# Patient Record
Sex: MALE | URBAN - METROPOLITAN AREA
[De-identification: names, ages, dates, MRNs, and addresses within clinical notes are randomized per-mention and may not be internally consistent; named-entity substitution may affect disease eponyms.]

---

## 2021-05-18 ENCOUNTER — OFFICE VISIT (OUTPATIENT)
Dept: URBAN - METROPOLITAN AREA CLINIC 13 | Facility: CLINIC | Age: 36
End: 2021-05-18
Payer: COMMERCIAL

## 2021-05-18 DIAGNOSIS — H30.23 BILATERAL PARS PLANITIS: Primary | ICD-10-CM

## 2021-05-18 DIAGNOSIS — H25.043 POSTERIOR SUBCAPSULAR POLAR AGE-RELATED CATARACT, BILATERAL: ICD-10-CM

## 2021-05-18 DIAGNOSIS — H33.311 HORSESHOE TEAR OF RETINA WITHOUT DETACHMENT, RIGHT EYE: ICD-10-CM

## 2021-05-18 PROCEDURE — 92134 CPTRZ OPH DX IMG PST SGM RTA: CPT | Performed by: OPHTHALMOLOGY

## 2021-05-18 PROCEDURE — 99214 OFFICE O/P EST MOD 30 MIN: CPT | Performed by: OPHTHALMOLOGY

## 2021-05-18 ASSESSMENT — INTRAOCULAR PRESSURE
OD: 13
OS: 13

## 2021-05-18 NOTE — IMPRESSION/PLAN
Impression: Posterior subcapsular polar age-related cataract, bilateral Plan: Stable, possibly early visually significant

## 2021-05-18 NOTE — IMPRESSION/PLAN
Impression: Bilateral pars planitis OCT OU= no SRF/IRF  / 288 Optos FA OU - 05/18/2021 = no leak at nerve/vessels/macula OU Plan: Exam/optos colors show no active inflammation OU. Dx'd in 2010, no h/o injections or systemic immunosuppression; only topical Tx as needed. 

6-12 months, OCT OU, Optos FA OU NPHX

## 2022-09-26 ENCOUNTER — OFFICE VISIT (OUTPATIENT)
Dept: URBAN - METROPOLITAN AREA CLINIC 13 | Facility: CLINIC | Age: 37
End: 2022-09-26
Payer: COMMERCIAL

## 2022-09-26 DIAGNOSIS — H25.043 POSTERIOR SUBCAPSULAR POLAR AGE-RELATED CATARACT, BILATERAL: ICD-10-CM

## 2022-09-26 DIAGNOSIS — H33.311 HORSESHOE TEAR OF RETINA WITHOUT DETACHMENT, RIGHT EYE: ICD-10-CM

## 2022-09-26 DIAGNOSIS — H30.23 BILATERAL PARS PLANITIS: Primary | ICD-10-CM

## 2022-09-26 PROCEDURE — 99213 OFFICE O/P EST LOW 20 MIN: CPT | Performed by: OPHTHALMOLOGY

## 2022-09-26 PROCEDURE — 92134 CPTRZ OPH DX IMG PST SGM RTA: CPT | Performed by: OPHTHALMOLOGY

## 2022-09-26 ASSESSMENT — INTRAOCULAR PRESSURE
OD: 19
OS: 12

## 2022-09-26 NOTE — IMPRESSION/PLAN
Impression: Horseshoe tear of retina without detachment, right eye
s/p laser Plan: no RD/RT no ERM
RDW
call ASAP with changes

## 2022-09-26 NOTE — IMPRESSION/PLAN
Impression: Posterior subcapsular polar age-related cataract, bilateral Plan: Stable, possibly early visually significant Rec obs

## 2022-09-26 NOTE — IMPRESSION/PLAN
Impression: Bilateral pars planitis OCT OU= no SRF/IRF OU  / 276 Optos FA OU (05/18/2021) = no leak at nerve/vessels/macula OU Optos FA OU 09/26/2022 = stain of laser scars OD, but no leak at nerve/vessels/macula OU Plan: Exam/optos colors show no active inflammation OU. Dx'd in 2010, no h/o injections or systemic immunosuppression; only topical Tx as needed. Stable. Rec obs.  

12 months, OCT OU

## 2023-05-24 ENCOUNTER — OFFICE VISIT (OUTPATIENT)
Dept: URBAN - METROPOLITAN AREA CLINIC 13 | Facility: CLINIC | Age: 38
End: 2023-05-24
Payer: COMMERCIAL

## 2023-05-24 DIAGNOSIS — H25.043 POSTERIOR SUBCAPSULAR POLAR AGE-RELATED CATARACT, BILATERAL: ICD-10-CM

## 2023-05-24 DIAGNOSIS — H30.23 BILATERAL PARS PLANITIS: Primary | ICD-10-CM

## 2023-05-24 DIAGNOSIS — H33.311 HORSESHOE TEAR OF RETINA WITHOUT DETACHMENT, RIGHT EYE: ICD-10-CM

## 2023-05-24 PROCEDURE — 92134 CPTRZ OPH DX IMG PST SGM RTA: CPT | Performed by: OPHTHALMOLOGY

## 2023-05-24 PROCEDURE — 92235 FLUORESCEIN ANGRPH MLTIFRAME: CPT | Performed by: OPHTHALMOLOGY

## 2023-05-24 PROCEDURE — 92250 FUNDUS PHOTOGRAPHY W/I&R: CPT | Performed by: OPHTHALMOLOGY

## 2023-05-24 PROCEDURE — 99212 OFFICE O/P EST SF 10 MIN: CPT | Performed by: OPHTHALMOLOGY

## 2023-05-24 ASSESSMENT — INTRAOCULAR PRESSURE
OD: 18
OS: 26

## 2023-05-24 NOTE — IMPRESSION/PLAN
Impression: Bilateral pars planitis OCT OU = no SRF/IRF OU /282 Optos FA OU (05/18/2021) = no leak at nerve/vessels/macula OU Optos FA OU (09/26/2022) = stain of laser scars OD, but no leak at nerve/vessels/macula OU Optos FA OU (05/24/2023) = stain of laser scars OD, but no leak at nerve/vessels/macula OU Plan: Noticed more floaters over the past month. Today, symptoms are improving. Will obs for now, if he notices increase in floaters, ok to start PF QID OD. Exam/optos colors show no active inflammation OU. Dx'd in 2010, no h/o injections or systemic immunosuppression; only topical Tx as needed. 


6 months, OCT OU

## 2023-07-06 ENCOUNTER — OFFICE VISIT (OUTPATIENT)
Dept: URBAN - METROPOLITAN AREA CLINIC 7 | Facility: CLINIC | Age: 38
End: 2023-07-06
Payer: COMMERCIAL

## 2023-07-06 DIAGNOSIS — H25.043 POSTERIOR SUBCAPSULAR POLAR AGE-RELATED CATARACT, BILATERAL: ICD-10-CM

## 2023-07-06 DIAGNOSIS — H30.23 BILATERAL PARS PLANITIS: Primary | ICD-10-CM

## 2023-07-06 DIAGNOSIS — H33.311 HORSESHOE TEAR OF RETINA WITHOUT DETACHMENT, RIGHT EYE: ICD-10-CM

## 2023-07-06 PROCEDURE — 92134 CPTRZ OPH DX IMG PST SGM RTA: CPT | Performed by: STUDENT IN AN ORGANIZED HEALTH CARE EDUCATION/TRAINING PROGRAM

## 2023-07-06 PROCEDURE — 99214 OFFICE O/P EST MOD 30 MIN: CPT | Performed by: STUDENT IN AN ORGANIZED HEALTH CARE EDUCATION/TRAINING PROGRAM

## 2023-07-06 ASSESSMENT — INTRAOCULAR PRESSURE
OS: 25
OD: 24

## 2023-07-06 NOTE — IMPRESSION/PLAN
Impression: Bilateral pars planitis OCT OU = no SRF/IRF OU CST Optos FA OU (05/18/2021) = no leak at nerve/vessels/macula OU Optos FA OU (09/26/2022) = stain of laser scars OD, but no leak at nerve/vessels/macula OU Optos FA OU (05/24/2023) = stain of laser scars OD, but no leak at nerve/vessels/macula OU Plan: Patient returns due to feeling a pressure sensation OD a few days ago, and occasional FBS when looking side to side. No inflammation on today's examination, IOP slightly elevated OU (patient reports squeezing). Patient started PF QID OD per his last discussion with [facility]. OK to continue these. Exam/optos colors show no active inflammation OU. Dx'd in 2010, no h/o injections or systemic immunosuppression; only topical Tx as needed. 


RTC as scheduled with [facility] with OCT OU

## 2023-07-10 ENCOUNTER — OFFICE VISIT (OUTPATIENT)
Dept: URBAN - METROPOLITAN AREA CLINIC 54 | Facility: CLINIC | Age: 38
End: 2023-07-10
Payer: COMMERCIAL

## 2023-07-10 DIAGNOSIS — H30.23 BILATERAL PARS PLANITIS: Primary | ICD-10-CM

## 2023-07-10 DIAGNOSIS — H33.311 HORSESHOE TEAR OF RETINA WITHOUT DETACHMENT, RIGHT EYE: ICD-10-CM

## 2023-07-10 DIAGNOSIS — H25.043 POSTERIOR SUBCAPSULAR POLAR AGE-RELATED CATARACT, BILATERAL: ICD-10-CM

## 2023-07-10 PROCEDURE — 99213 OFFICE O/P EST LOW 20 MIN: CPT | Performed by: OPHTHALMOLOGY

## 2023-07-10 PROCEDURE — 92134 CPTRZ OPH DX IMG PST SGM RTA: CPT | Performed by: OPHTHALMOLOGY

## 2023-07-10 ASSESSMENT — INTRAOCULAR PRESSURE
OD: 15
OS: 15

## 2023-07-10 NOTE — IMPRESSION/PLAN
Impression: Bilateral pars planitis OCT OU = no SRF/IRF OU CST Optos FA OU (05/18/2021) = no leak at nerve/vessels/macula OU Optos FA OU (09/26/2022) = stain of laser scars OD, but no leak at nerve/vessels/macula OU Optos FA OU (05/24/2023) = stain of laser scars OD, but no leak at nerve/vessels/macula OU Plan: He returns and complains of pain and worse vision OD x 2 days. Exam is unremarkable and unchanged. Specifically, there is no intraocular inflammation or corneal staining/dendrites to account for the pain. IOP is normal. 

OCT shows no CME/SRF OU. Recommend he decrease AFTs and PF to QID and taper off. Exam/optos colors show no active inflammation OU. Dx'd in 2010, no h/o injections or systemic immunosuppression; only topical Tx as needed. 

RTC as scheduled 1-2 weeks with [facility] and OCT OU

## 2023-07-17 ENCOUNTER — OFFICE VISIT (OUTPATIENT)
Dept: URBAN - METROPOLITAN AREA CLINIC 13 | Facility: CLINIC | Age: 38
End: 2023-07-17
Payer: COMMERCIAL

## 2023-07-17 DIAGNOSIS — H47.10 PAPILLEDEMA: ICD-10-CM

## 2023-07-17 DIAGNOSIS — H25.043 POSTERIOR SUBCAPSULAR POLAR AGE-RELATED CATARACT, BILATERAL: ICD-10-CM

## 2023-07-17 DIAGNOSIS — H30.23 BILATERAL PARS PLANITIS: Primary | ICD-10-CM

## 2023-07-17 DIAGNOSIS — H33.311 HORSESHOE TEAR OF RETINA WITHOUT DETACHMENT, RIGHT EYE: ICD-10-CM

## 2023-07-17 PROCEDURE — 99212 OFFICE O/P EST SF 10 MIN: CPT | Performed by: OPHTHALMOLOGY

## 2023-07-17 PROCEDURE — 92235 FLUORESCEIN ANGRPH MLTIFRAME: CPT | Performed by: OPHTHALMOLOGY

## 2023-07-17 PROCEDURE — 92134 CPTRZ OPH DX IMG PST SGM RTA: CPT | Performed by: OPHTHALMOLOGY

## 2023-07-17 PROCEDURE — 92250 FUNDUS PHOTOGRAPHY W/I&R: CPT | Performed by: OPHTHALMOLOGY

## 2023-07-17 ASSESSMENT — INTRAOCULAR PRESSURE
OD: 14
OS: 14

## 2023-07-17 NOTE — IMPRESSION/PLAN
Impression: Bilateral pars planitis OCT OU = no SRF/IRF OU  / 290 Optos FA OU (05/18/2021) = no leak at nerve/vessels/macula OU Optos FA OU (09/26/2022) = stain of laser scars OD, but no leak at nerve/vessels/macula OU Optos FA OU (05/24/2023) = stain of laser scars OD, but no leak at nerve/vessels/macula OU Optos FA OU (05/24/2023) = stain of laser scars OD, but no leak at nerve/vessels/macula OU Plan: Here with vision loss Has some pain with eye movement, but better Has swollen nerve OD - c/w optic neuritis
check FA/OCT RNFL OD = c/w nerve edema Most likely optic neuritis in the setting of pars planitis, which may benefit from eval for MS and IV solumedrol, although differential does include anterior ischemic optic neuropathy, depressive disorder (such as brain tumor) and/or temporal ateritis (no symptoms and too young). It is recommended that the patient go to the ED to undergo a careful medical evaluation to include blood pressure assessment, carotid doppler evaluation, CBC with differential, CRP, ESR, and possible cardiac echocardiogram as well as neurological eval with MRI, possible intravenous steroids. We will continue to monitor this condition closely. 

1 month, dilate OU

## 2023-08-14 ENCOUNTER — OFFICE VISIT (OUTPATIENT)
Dept: URBAN - METROPOLITAN AREA CLINIC 13 | Facility: CLINIC | Age: 38
End: 2023-08-14
Payer: COMMERCIAL

## 2023-08-14 DIAGNOSIS — H30.23 POSTERIOR CYCLITIS, BILATERAL: Primary | ICD-10-CM

## 2023-08-14 DIAGNOSIS — H33.311 HORSESHOE TEAR OF RETINA WITHOUT DETACHMENT, RIGHT EYE: ICD-10-CM

## 2023-08-14 DIAGNOSIS — H47.10 PAPILLEDEMA: ICD-10-CM

## 2023-08-14 DIAGNOSIS — H25.043 POSTERIOR SUBCAPSULAR POLAR AGE-RELATED CATARACT, BILATERAL: ICD-10-CM

## 2023-08-14 PROCEDURE — 92134 CPTRZ OPH DX IMG PST SGM RTA: CPT | Performed by: OPHTHALMOLOGY

## 2023-08-14 PROCEDURE — 92133 CPTRZD OPH DX IMG PST SGM ON: CPT | Performed by: OPHTHALMOLOGY

## 2023-08-14 PROCEDURE — 99212 OFFICE O/P EST SF 10 MIN: CPT | Performed by: OPHTHALMOLOGY

## 2023-08-14 ASSESSMENT — INTRAOCULAR PRESSURE
OS: 17
OD: 16

## 2023-10-23 ENCOUNTER — OFFICE VISIT (OUTPATIENT)
Dept: URBAN - METROPOLITAN AREA CLINIC 13 | Facility: CLINIC | Age: 38
End: 2023-10-23
Payer: COMMERCIAL

## 2023-10-23 DIAGNOSIS — H33.311 HORSESHOE TEAR OF RETINA WITHOUT DETACHMENT, RIGHT EYE: ICD-10-CM

## 2023-10-23 DIAGNOSIS — H30.23 BILATERAL PARS PLANITIS: Primary | ICD-10-CM

## 2023-10-23 DIAGNOSIS — H25.043 POSTERIOR SUBCAPSULAR POLAR AGE-RELATED CATARACT, BILATERAL: ICD-10-CM

## 2023-10-23 DIAGNOSIS — H47.10 PAPILLEDEMA: ICD-10-CM

## 2023-10-23 PROCEDURE — 99212 OFFICE O/P EST SF 10 MIN: CPT | Performed by: OPHTHALMOLOGY

## 2023-10-23 PROCEDURE — 92134 CPTRZ OPH DX IMG PST SGM RTA: CPT | Performed by: OPHTHALMOLOGY

## 2023-10-23 ASSESSMENT — INTRAOCULAR PRESSURE
OS: 15
OD: 15